# Patient Record
Sex: MALE | Race: OTHER | HISPANIC OR LATINO | Employment: UNEMPLOYED | URBAN - METROPOLITAN AREA
[De-identification: names, ages, dates, MRNs, and addresses within clinical notes are randomized per-mention and may not be internally consistent; named-entity substitution may affect disease eponyms.]

---
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
## 2023-08-19 ENCOUNTER — HOSPITAL ENCOUNTER (EMERGENCY)
Facility: HOSPITAL | Age: 18
Discharge: HOME | End: 2023-08-19
Attending: EMERGENCY MEDICINE

## 2023-08-19 VITALS
HEART RATE: 95 BPM | DIASTOLIC BLOOD PRESSURE: 63 MMHG | RESPIRATION RATE: 16 BRPM | OXYGEN SATURATION: 98 % | TEMPERATURE: 98 F | SYSTOLIC BLOOD PRESSURE: 105 MMHG

## 2023-08-19 DIAGNOSIS — T40.601A OPIATE OVERDOSE, ACCIDENTAL OR UNINTENTIONAL, INITIAL ENCOUNTER: Primary | ICD-10-CM

## 2023-08-19 DIAGNOSIS — F19.10 POLYSUBSTANCE ABUSE: ICD-10-CM

## 2023-08-19 DIAGNOSIS — T50.901A OD (OVERDOSE OF DRUG): ICD-10-CM

## 2023-08-19 LAB
ALBUMIN SERPL BCP-MCNC: 4.5 G/DL (ref 3.5–5.2)
ALLENS TEST: ABNORMAL
ALP SERPL-CCNC: 56 U/L (ref 55–135)
ALT SERPL W/O P-5'-P-CCNC: 18 U/L (ref 10–44)
ANION GAP SERPL CALC-SCNC: 11 MMOL/L (ref 8–16)
AST SERPL-CCNC: 30 U/L (ref 10–40)
BASOPHILS # BLD AUTO: 0.05 K/UL (ref 0–0.2)
BASOPHILS NFR BLD: 0.7 % (ref 0–1.9)
BILIRUB SERPL-MCNC: 0.4 MG/DL (ref 0.1–1)
BUN SERPL-MCNC: 12 MG/DL (ref 6–20)
CALCIUM SERPL-MCNC: 9.3 MG/DL (ref 8.7–10.5)
CHLORIDE SERPL-SCNC: 102 MMOL/L (ref 95–110)
CO2 SERPL-SCNC: 27 MMOL/L (ref 23–29)
CREAT SERPL-MCNC: 0.9 MG/DL (ref 0.5–1.4)
DIFFERENTIAL METHOD: ABNORMAL
EOSINOPHIL # BLD AUTO: 0.3 K/UL (ref 0–0.5)
EOSINOPHIL NFR BLD: 3.7 % (ref 0–8)
ERYTHROCYTE [DISTWIDTH] IN BLOOD BY AUTOMATED COUNT: 12.8 % (ref 11.5–14.5)
EST. GFR  (NO RACE VARIABLE): >60 ML/MIN/1.73 M^2
GLUCOSE SERPL-MCNC: 93 MG/DL (ref 70–110)
HCO3 UR-SCNC: 28.7 MMOL/L (ref 24–28)
HCT VFR BLD AUTO: 37.1 % (ref 40–54)
HCV AB SERPL QL IA: NORMAL
HGB BLD-MCNC: 12.8 G/DL (ref 14–18)
HIV 1+2 AB+HIV1 P24 AG SERPL QL IA: NORMAL
IMM GRANULOCYTES # BLD AUTO: 0.02 K/UL (ref 0–0.04)
IMM GRANULOCYTES NFR BLD AUTO: 0.3 % (ref 0–0.5)
LYMPHOCYTES # BLD AUTO: 1.9 K/UL (ref 1–4.8)
LYMPHOCYTES NFR BLD: 27.7 % (ref 18–48)
MCH RBC QN AUTO: 30.9 PG (ref 27–31)
MCHC RBC AUTO-ENTMCNC: 34.5 G/DL (ref 32–36)
MCV RBC AUTO: 90 FL (ref 82–98)
MONOCYTES # BLD AUTO: 0.4 K/UL (ref 0.3–1)
MONOCYTES NFR BLD: 5.6 % (ref 4–15)
NEUTROPHILS # BLD AUTO: 4.3 K/UL (ref 1.8–7.7)
NEUTROPHILS NFR BLD: 62 % (ref 38–73)
NRBC BLD-RTO: 0 /100 WBC
PCO2 BLDA: 52.6 MMHG (ref 35–45)
PH SMN: 7.34 [PH] (ref 7.35–7.45)
PLATELET # BLD AUTO: 206 K/UL (ref 150–450)
PMV BLD AUTO: 9.4 FL (ref 9.2–12.9)
PO2 BLDA: 89 MMHG (ref 40–60)
POC BE: 3 MMOL/L
POC SATURATED O2: 96 % (ref 95–100)
POC TCO2: 30 MMOL/L (ref 24–29)
POTASSIUM SERPL-SCNC: 3.4 MMOL/L (ref 3.5–5.1)
PROT SERPL-MCNC: 7.5 G/DL (ref 6–8.4)
RBC # BLD AUTO: 4.14 M/UL (ref 4.6–6.2)
SAMPLE: ABNORMAL
SITE: ABNORMAL
SODIUM SERPL-SCNC: 140 MMOL/L (ref 136–145)
WBC # BLD AUTO: 6.94 K/UL (ref 3.9–12.7)

## 2023-08-19 PROCEDURE — 80053 COMPREHEN METABOLIC PANEL: CPT | Performed by: EMERGENCY MEDICINE

## 2023-08-19 PROCEDURE — 93010 ELECTROCARDIOGRAM REPORT: CPT | Mod: ,,, | Performed by: INTERNAL MEDICINE

## 2023-08-19 PROCEDURE — 93005 ELECTROCARDIOGRAM TRACING: CPT

## 2023-08-19 PROCEDURE — 87389 HIV-1 AG W/HIV-1&-2 AB AG IA: CPT | Performed by: EMERGENCY MEDICINE

## 2023-08-19 PROCEDURE — 86803 HEPATITIS C AB TEST: CPT | Performed by: EMERGENCY MEDICINE

## 2023-08-19 PROCEDURE — 96374 THER/PROPH/DIAG INJ IV PUSH: CPT

## 2023-08-19 PROCEDURE — 85025 COMPLETE CBC W/AUTO DIFF WBC: CPT | Performed by: EMERGENCY MEDICINE

## 2023-08-19 PROCEDURE — 63600175 PHARM REV CODE 636 W HCPCS: Performed by: EMERGENCY MEDICINE

## 2023-08-19 PROCEDURE — 82803 BLOOD GASES ANY COMBINATION: CPT

## 2023-08-19 PROCEDURE — 99284 EMERGENCY DEPT VISIT MOD MDM: CPT | Mod: 25

## 2023-08-19 PROCEDURE — 96361 HYDRATE IV INFUSION ADD-ON: CPT

## 2023-08-19 PROCEDURE — 93010 EKG 12-LEAD: ICD-10-PCS | Mod: ,,, | Performed by: INTERNAL MEDICINE

## 2023-08-19 PROCEDURE — 99900035 HC TECH TIME PER 15 MIN (STAT)

## 2023-08-19 RX ORDER — NALOXONE HCL 0.4 MG/ML
1 VIAL (ML) INJECTION
Status: COMPLETED | OUTPATIENT
Start: 2023-08-19 | End: 2023-08-19

## 2023-08-19 RX ORDER — NALOXONE HYDROCHLORIDE 4 MG/.1ML
SPRAY NASAL
Qty: 1 EACH | Refills: 11 | Status: SHIPPED | OUTPATIENT
Start: 2023-08-19

## 2023-08-19 RX ORDER — NALOXONE HYDROCHLORIDE 1 MG/ML
1 INJECTION INTRAMUSCULAR; INTRAVENOUS; SUBCUTANEOUS
Status: DISCONTINUED | OUTPATIENT
Start: 2023-08-19 | End: 2023-08-19 | Stop reason: HOSPADM

## 2023-08-19 RX ADMIN — NALXONE HYDROCHLORIDE 1 MG: 0.4 INJECTION INTRAMUSCULAR; INTRAVENOUS; SUBCUTANEOUS at 01:08

## 2023-08-19 RX ADMIN — SODIUM CHLORIDE, SODIUM LACTATE, POTASSIUM CHLORIDE, AND CALCIUM CHLORIDE 1000 ML: .6; .31; .03; .02 INJECTION, SOLUTION INTRAVENOUS at 03:08

## 2023-08-19 NOTE — ED NOTES
Rakesh Avery, a 17 y.o. male presents to the ED w/ complaint of drug overdose. Per EMS, pt was using marijuana laced with fentanyl. Pt received narcan, EMS unsure of how much, and 5mg of versed. Pt unresponsive at this time. Pinpoint pupils    Triage note:  Chief Complaint   Patient presents with    Drug Overdose     Unresponsive per family. Per family, pt took opiates and amphetamines tonight. Given 2mg narcan by fire with positive response. Pt combative with EMS. Given 5mg IM versed by EMS en route. +ETOH     Review of patient's allergies indicates:  No Known Allergies  History reviewed. No pertinent past medical history. Patient identifiers for Rakesh Avery checked and correct.

## 2023-08-19 NOTE — ED NOTES
"Pt provided with urinal. Pt walked to bathroom with urinal in hand. Pt walked out of restroom with empty urinal stating "oops"  "

## 2023-08-20 NOTE — ED PROVIDER NOTES
History:  Rakesh Avery is a 17 y.o. male who presents to the ED with Drug Overdose (Unresponsive per family. Per family, pt took opiates and amphetamines tonight. Given 2mg narcan by fire with positive response. Pt combative with EMS. Given 5mg IM versed by EMS en route. +ETOH)    Described as previously healthy 17-year-old male presenting to the emergency department after an alleged overdose.  Father reports that he found him outside and was unresponsive.  He called 911.  He was given 2 mg Narcan by fire with positive response.  He was combative with EMS and therefore given 5 mg IM Versed and subsequently slept.    History is limited due to altered mental status.    Review of Systems:  Unable to obtain due to altered mental status    Medications:   Discharge Medication List as of 8/19/2023 12:54 AM          PMH: History reviewed. No pertinent past medical history.  PSH:   Past Surgical History:   Procedure Laterality Date    ABDOMINAL SURGERY      TESTICLE SURGERY       Allergies: He has No Known Allergies.  Social History: Marital Status: single. He  has no history on file for tobacco use.. He  has no history on file for alcohol use..       Exam:  VITAL SIGNS:   Vitals:    08/19/23 0140 08/19/23 0215 08/19/23 0331 08/19/23 0548   BP:  (!) 107/53 116/72 105/63   BP Location:  Right arm Right arm Right arm   Patient Position:  Lying Lying Lying   Pulse: 91 87 102 95   Resp: 12 16 16 16   Temp:    98.2 °F (36.8 °C)   TempSrc:    Oral   SpO2: 95% 95% 100% 98%     Const:  Sleeping, protecting airway  Head: Atraumatic  Eyes: Normal Conjunctiva, pupils pinpoint  ENT:  Cauliflower deformity of right ear.  Small Chronic appearing hematoma to superior aspect of left ear  Neck: Full range of motion. No meningismus.  Resp: Normal respiratory effort, No distress, CTAB  Cardio: Equal and intact distal pulses, RRR  Abd: Soft, non tender, non distended.   Skin: No petechiae or rashes  Ext: No cyanosis, or edema  Neur:   Somnolent, protecting airway    Data:  Results for orders placed or performed during the hospital encounter of 08/19/23   HIV 1/2 Ag/Ab (4th Gen)   Result Value Ref Range    HIV 1/2 Ag/Ab Non-reactive Non-reactive   Hepatitis C Antibody   Result Value Ref Range    Hepatitis C Ab Non-reactive Non-reactive   CBC auto differential   Result Value Ref Range    WBC 6.94 3.90 - 12.70 K/uL    RBC 4.14 (L) 4.60 - 6.20 M/uL    Hemoglobin 12.8 (L) 14.0 - 18.0 g/dL    Hematocrit 37.1 (L) 40.0 - 54.0 %    MCV 90 82 - 98 fL    MCH 30.9 27.0 - 31.0 pg    MCHC 34.5 32.0 - 36.0 g/dL    RDW 12.8 11.5 - 14.5 %    Platelets 206 150 - 450 K/uL    MPV 9.4 9.2 - 12.9 fL    Immature Granulocytes 0.3 0.0 - 0.5 %    Gran # (ANC) 4.3 1.8 - 7.7 K/uL    Immature Grans (Abs) 0.02 0.00 - 0.04 K/uL    Lymph # 1.9 1.0 - 4.8 K/uL    Mono # 0.4 0.3 - 1.0 K/uL    Eos # 0.3 0.0 - 0.5 K/uL    Baso # 0.05 0.00 - 0.20 K/uL    nRBC 0 0 /100 WBC    Gran % 62.0 38.0 - 73.0 %    Lymph % 27.7 18.0 - 48.0 %    Mono % 5.6 4.0 - 15.0 %    Eosinophil % 3.7 0.0 - 8.0 %    Basophil % 0.7 0.0 - 1.9 %    Differential Method Automated    Comprehensive metabolic panel   Result Value Ref Range    Sodium 140 136 - 145 mmol/L    Potassium 3.4 (L) 3.5 - 5.1 mmol/L    Chloride 102 95 - 110 mmol/L    CO2 27 23 - 29 mmol/L    Glucose 93 70 - 110 mg/dL    BUN 12 6 - 20 mg/dL    Creatinine 0.9 0.5 - 1.4 mg/dL    Calcium 9.3 8.7 - 10.5 mg/dL    Total Protein 7.5 6.0 - 8.4 g/dL    Albumin 4.5 3.5 - 5.2 g/dL    Total Bilirubin 0.4 0.1 - 1.0 mg/dL    Alkaline Phosphatase 56 55 - 135 U/L    AST 30 10 - 40 U/L    ALT 18 10 - 44 U/L    eGFR >60.0 >60 mL/min/1.73 m^2    Anion Gap 11 8 - 16 mmol/L   ISTAT PROCEDURE   Result Value Ref Range    POC PH 7.345 (L) 7.35 - 7.45    POC PCO2 52.6 (H) 35 - 45 mmHg    POC PO2 89 (HH) 40 - 60 mmHg    POC HCO3 28.7 (H) 24 - 28 mmol/L    POC BE 3 -2 to 2 mmol/L    POC SATURATED O2 96 95 - 100 %    POC TCO2 30 (H) 24 - 29 mmol/L    Sample VENOUS      Site Other     Allens Test N/A      12-LEAD EKG INTERPRETATION BY ME:  Rate/Rhythm: Normal Sinus Rhythm with rate of 94 beats per minute  QRS, ST, T-waves: No changes consistent with acute ischemia  Impression:  No evidence of ischemia or arrhythmia     Labs & Imaging studies were reviewed independently by me.     Medical Decision Making:  Previously healthy 17-year-old male presenting to the emergency department after alleged polysubstance abuse.  He was Narcan wake up, though then required Versed as he was combative with EMS and subsequently has been minimally responsive since that time.  No reported focal neurologic deficit.  Blood sugar was normal in the field.  Basic laboratory studies were obtained and patient's mental status improved throughout his stay in the emergency department.  EKG does not reveal any signs of arrhythmia.  He was given another 2 mg Narcan without response.  I suspect that his somnolence secondary to the Versed provided by EMS combined with his other substances and likely alcohol.  Basic laboratory studies unremarkable.  VBG shows a mild chronic hypercapnia.  He was protecting his airway without difficulty.  On reassessment, he opens his eyes, though was still slightly confused and appears intoxicated.  He was observed further, became ambulatory without difficulty, refused to give a urine sample.  He endorses drinking alcohol though denies any other substance abuse.  He was informed that he likely overdosed on an opioid and was prescribed Narcan.  He was now awake and alert, no longer clinically intoxicated, GCS 15.  Vital signs remained stable.  Regarding his ear, he does have chronic call if lower deformity of the right ear as well as a chronic appearing hematoma to the left ear.  He reports he has been swollen for the past 2 months so denies any trauma.  He was encouraged to follow up with his PCP and possible ENT.  No signs of secondary infection at this time.  Strict return  precautions were discussed, and patient and father were agreeable with the plan.    Clinical Impression:  1. Opiate overdose, accidental or unintentional, initial encounter    2. OD (overdose of drug)    3. Polysubstance abuse             Medication List        START taking these medications      naloxone 4 mg/actuation Spry  Commonly known as: NARCAN  4mg by nasal route as needed for opioid overdose; may repeat every 2-3 minutes in alternating nostrils until medical help arrives. Call 911               Where to Get Your Medications        You can get these medications from any pharmacy    Bring a paper prescription for each of these medications  naloxone 4 mg/actuation Spry         Follow-up Information       [facility]    Specialty: Internal Medicine  Why: As needed  Contact information:  [address]  [facility] [address] [phone]  Additional information:  [facility]   Please park in surface lot and check in at central registration desk                             Tracie Miranda MD  08/19/23 0605